# Patient Record
Sex: FEMALE | Race: WHITE | Employment: UNEMPLOYED | URBAN - METROPOLITAN AREA
[De-identification: names, ages, dates, MRNs, and addresses within clinical notes are randomized per-mention and may not be internally consistent; named-entity substitution may affect disease eponyms.]

---

## 2019-11-28 ENCOUNTER — HOSPITAL ENCOUNTER (EMERGENCY)
Age: 79
Discharge: HOME | End: 2019-11-28
Payer: MEDICARE

## 2019-11-28 VITALS
RESPIRATION RATE: 16 BRPM | WEIGHT: 137 LBS | OXYGEN SATURATION: 97 % | SYSTOLIC BLOOD PRESSURE: 189 MMHG | HEIGHT: 66 IN | BODY MASS INDEX: 22.02 KG/M2 | HEART RATE: 94 BPM | TEMPERATURE: 97.6 F | DIASTOLIC BLOOD PRESSURE: 106 MMHG

## 2019-11-28 DIAGNOSIS — I10 HYPERTENSION, UNSPECIFIED TYPE: ICD-10-CM

## 2019-11-28 DIAGNOSIS — S61.219A LACERATION OF FINGER OF RIGHT HAND WITHOUT FOREIGN BODY WITHOUT DAMAGE TO NAIL, UNSPECIFIED FINGER, INITIAL ENCOUNTER: Primary | ICD-10-CM

## 2019-11-28 PROCEDURE — 99282 EMERGENCY DEPT VISIT SF MDM: CPT

## 2024-08-01 ENCOUNTER — APPOINTMENT (OUTPATIENT)
Dept: CT IMAGING | Age: 84
End: 2024-08-01
Payer: MEDICARE

## 2024-08-01 ENCOUNTER — HOSPITAL ENCOUNTER (EMERGENCY)
Age: 84
Discharge: HOME | End: 2024-08-01
Payer: MEDICARE

## 2024-08-01 ENCOUNTER — APPOINTMENT (OUTPATIENT)
Dept: GENERAL RADIOLOGY | Age: 84
End: 2024-08-01
Payer: MEDICARE

## 2024-08-01 VITALS
RESPIRATION RATE: 14 BRPM | HEIGHT: 67 IN | OXYGEN SATURATION: 95 % | WEIGHT: 131 LBS | DIASTOLIC BLOOD PRESSURE: 86 MMHG | HEART RATE: 95 BPM | BODY MASS INDEX: 20.56 KG/M2 | TEMPERATURE: 98.8 F | SYSTOLIC BLOOD PRESSURE: 156 MMHG

## 2024-08-01 DIAGNOSIS — S02.40DA CLOSED FRACTURE OF LEFT SIDE OF MAXILLA, INITIAL ENCOUNTER (HCC): Primary | ICD-10-CM

## 2024-08-01 PROCEDURE — 72125 CT NECK SPINE W/O DYE: CPT

## 2024-08-01 PROCEDURE — 73120 X-RAY EXAM OF HAND: CPT

## 2024-08-01 PROCEDURE — 70450 CT HEAD/BRAIN W/O DYE: CPT

## 2024-08-01 PROCEDURE — 99284 EMERGENCY DEPT VISIT MOD MDM: CPT

## 2024-08-01 PROCEDURE — 70486 CT MAXILLOFACIAL W/O DYE: CPT

## 2024-08-01 RX ORDER — OXYCODONE HYDROCHLORIDE 5 MG/1
5 TABLET ORAL EVERY 6 HOURS PRN
Qty: 12 TABLET | Refills: 0 | Status: SHIPPED | OUTPATIENT
Start: 2024-08-01 | End: 2024-08-04

## 2024-08-01 RX ORDER — AMOXICILLIN AND CLAVULANATE POTASSIUM 875; 125 MG/1; MG/1
1 TABLET, FILM COATED ORAL 2 TIMES DAILY
Qty: 14 TABLET | Refills: 0 | Status: SHIPPED | OUTPATIENT
Start: 2024-08-01 | End: 2024-08-08

## 2024-08-01 RX ORDER — BETAMETHASONE DIPROPIONATE 0.05 %
OINTMENT (GRAM) TOPICAL
COMMUNITY
Start: 2024-03-04

## 2024-08-01 ASSESSMENT — PAIN SCALES - GENERAL
PAINLEVEL_OUTOF10: 8
PAINLEVEL_OUTOF10: 4

## 2024-08-01 ASSESSMENT — PAIN - FUNCTIONAL ASSESSMENT
PAIN_FUNCTIONAL_ASSESSMENT: 0-10
PAIN_FUNCTIONAL_ASSESSMENT: 0-10

## 2024-08-01 ASSESSMENT — LIFESTYLE VARIABLES
HOW OFTEN DO YOU HAVE A DRINK CONTAINING ALCOHOL: 2-4 TIMES A MONTH
HOW MANY STANDARD DRINKS CONTAINING ALCOHOL DO YOU HAVE ON A TYPICAL DAY: 1 OR 2

## 2024-08-01 ASSESSMENT — PAIN DESCRIPTION - LOCATION: LOCATION: FACE

## 2024-08-01 ASSESSMENT — PAIN DESCRIPTION - PAIN TYPE: TYPE: ACUTE PAIN

## 2024-08-01 NOTE — ED PROVIDER NOTES
[facility]  ED  Emergency Department Encounter     Patient Name: Cindy Rosario  MRN: [id number]  YOB: 1940  Date of Evaluation: 8/1/2024  Provider: Arben Montelongo MD  Note Started: 12:20 PM EDT 8/1/24     CHIEF COMPLAINT  Fall (Tripped over hole in gas station parking lot. Fell face forward; patient complaining of nose pain/ jaw pain/ teeth pain/left knee pain./Denies any LOC or blood thinner use. )        HISTORY OF PRESENT ILLNESS  Cindy Rosario is a 83 y.o. female who presents to the ED reports she is at a gas station while she was looking down at her phone and walking.  She apparently stepped in a hole with a puddle causing her to fall forward onto her face.  She does not believe she lost consciousness but is having a lot of facial pain since the incident occurred.  She is not on any anticoagulation medication.  Not experiencing any visual disturbances.  She does have mild pain in her right hand with palpation but otherwise has no other complaints.  Review of systems otherwise negative.     No other complaints, modifying factors or associated symptoms.      Nursing notes reviewed were all reviewed and agreed with or any disagreements were addressed in the HPI.     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 No pertinent family history.     Social History:  Social History               Socioeconomic History    Marital status:        Spouse name: Not on file    Number of children: Not on file    Years of education: Not on file    Highest education level: Not on file

## 2024-08-01 NOTE — ED NOTES
[facility]  ED  Emergency Department Encounter    Patient Name: Cindy Rosario  MRN: [id number]  YOB: 1940  Date of Evaluation: 8/1/2024  Provider: Arben Montelongo MD  Note Started: 12:20 PM EDT 8/1/24    CHIEF COMPLAINT  Fall (Tripped over hole in gas station parking lot. Fell face forward; patient complaining of nose pain/ jaw pain/ teeth pain/left knee pain./Denies any LOC or blood thinner use. )      HISTORY OF PRESENT ILLNESS  Cindy Rosario is a 83 y.o. female who presents to the ED reports she is at a gas station while she was looking down at her phone and walking.  She apparently stepped in a hole with a puddle causing her to fall forward onto her face.  She does not believe she lost consciousness but is having a lot of facial pain since the incident occurred.  She is not on any anticoagulation medication.  Not experiencing any visual disturbances.  She does have mild pain in her right hand with palpation but otherwise has no other complaints.  Review of systems otherwise negative.    No other complaints, modifying factors or associated symptoms.     Nursing notes reviewed were all reviewed and agreed with or any disagreements were addressed in the HPI.    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 No pertinent family history.  Social History:  Social History     Socioeconomic History    Marital status:      Spouse name: Not on file    Number of children: Not on file    Years of education: Not on file    Highest education level: Not on file   Occupational History    Not on file   Tobacco Use    Smoking status: Former     Current packs/day: 0.00     Types:

## 2024-08-01 NOTE — ED NOTES
@1410 called Otolaryngology per Dominguez Benavidez Jr., PA   RE:  facial fractures  @ 1423 Dr. Oliver called back and spoke to Dominguez Benavidez Jr., PA

## 2024-08-01 NOTE — ED NOTES
Dc instructions given and reviewed with patient and daughter. Follow up with ENT. Verbalized understanding. Pt declined assistance out of ED, ambulatory with no assist.

## 2024-08-05 ENCOUNTER — TELEPHONE (OUTPATIENT)
Dept: ENT CLINIC | Age: 84
End: 2024-08-05

## 2024-08-05 NOTE — TELEPHONE ENCOUNTER
Called and spoke with daughter Adeline, made aware of Dr Marin note in detail, verbalized understanding. Repeated phone number back to me.

## 2024-08-05 NOTE — TELEPHONE ENCOUNTER
----- Message from Chele Marin MD sent at 8/5/2024 12:52 PM EDT -----  Please let patient know we do not manage facial fractures at [facility]. She can call  ENT at [phone] for an appointment in the facial trauma clinic.   RACHEL